# Patient Record
Sex: FEMALE | Race: WHITE | Employment: FULL TIME | URBAN - METROPOLITAN AREA
[De-identification: names, ages, dates, MRNs, and addresses within clinical notes are randomized per-mention and may not be internally consistent; named-entity substitution may affect disease eponyms.]

---

## 2018-01-15 ENCOUNTER — HOSPITAL ENCOUNTER (OUTPATIENT)
Dept: MAMMOGRAPHY | Age: 58
Discharge: HOME | End: 2018-01-15
Attending: INTERNAL MEDICINE | Admitting: INTERNAL MEDICINE

## 2018-01-15 DIAGNOSIS — Z12.31 VISIT FOR SCREENING MAMMOGRAM: ICD-10-CM

## 2019-04-06 ENCOUNTER — HOSPITAL ENCOUNTER (OUTPATIENT)
Dept: WOMENS IMAGING | Age: 59
Discharge: HOME | End: 2019-04-06
Payer: COMMERCIAL

## 2019-04-06 DIAGNOSIS — Z12.39 BREAST CANCER SCREENING: ICD-10-CM

## 2019-04-06 PROCEDURE — 77067 SCR MAMMO BI INCL CAD: CPT

## 2019-08-19 ENCOUNTER — OFFICE VISIT (OUTPATIENT)
Dept: ORTHOPEDIC SURGERY | Age: 59
End: 2019-08-19
Payer: COMMERCIAL

## 2019-08-19 VITALS — HEIGHT: 63 IN | WEIGHT: 138 LBS | BODY MASS INDEX: 24.45 KG/M2

## 2019-08-19 DIAGNOSIS — R52 PAIN: Primary | ICD-10-CM

## 2019-08-19 DIAGNOSIS — M25.511 RIGHT SHOULDER PAIN, UNSPECIFIED CHRONICITY: ICD-10-CM

## 2019-08-19 PROCEDURE — 99203 OFFICE O/P NEW LOW 30 MIN: CPT | Performed by: ORTHOPAEDIC SURGERY

## 2019-08-19 RX ORDER — LEVOTHYROXINE SODIUM 0.07 MG/1
75 TABLET ORAL
COMMUNITY
Start: 2018-10-16

## 2019-08-19 RX ORDER — ATORVASTATIN CALCIUM 20 MG/1
TABLET, FILM COATED ORAL
COMMUNITY
Start: 2019-07-23

## 2019-08-19 RX ORDER — ASPIRIN 81 MG/1
81 TABLET ORAL
COMMUNITY
Start: 2016-06-21

## 2019-08-19 NOTE — PROGRESS NOTES
file    Sexual activity: Not on file   Lifestyle    Physical activity:     Days per week: Not on file     Minutes per session: Not on file    Stress: Not on file   Relationships    Social connections:     Talks on phone: Not on file     Gets together: Not on file     Attends [employer] service: Not on file     Active member of club or organization: Not on file     Attends meetings of clubs or organizations: Not on file     Relationship status: Not on file    Intimate partner violence:     Fear of current or ex partner: Not on file     Emotionally abused: Not on file     Physically abused: Not on file     Forced sexual activity: Not on file   Other Topics Concern    Not on file   Social History Narrative    Not on file       No family history on file. Review of Systems  Pertinent items are noted in HPI  Review of systems reviewed from Patient History Form dated on 8/19/2019 and available in the patient's chart under the Media tab. Vital Signs  There were no vitals filed for this visit. General Exam:   Constitutional: Patient is adequately groomed with no evidence of malnutrition  Mental Status: The patient is oriented to time, place and person. The patient's mood and affect are appropriate. Lymphatic: The lymphatic examination bilaterally reveals all areas to be without enlargement or induration. Neurological: The patient has good coordination. There is no weakness or sensory deficit. Gait: normal    Right shoulder Examination    Inspection: No bruising or atrophy    Palpation: Tenderness over biceps tendon and anterolateral aspect of shoulder    Cervical Exam reproduces shoulder symptoms: Negative    Range of Motion:     Forward elevation: 170  External rotation at 0 degrees abduction: 45  Internal rotation to: L3 versus L1  External rotation at 90 degrees abduction: 90  Internal rotation at 90 degrees abduction: 70  Symmetric motion unless otherwise stated    Sensation: In tact to light

## 2019-08-21 ENCOUNTER — HOSPITAL ENCOUNTER (OUTPATIENT)
Dept: PHYSICAL THERAPY | Age: 59
Setting detail: THERAPIES SERIES
Discharge: HOME | End: 2019-08-21
Payer: COMMERCIAL

## 2019-08-21 PROCEDURE — 97140 MANUAL THERAPY 1/> REGIONS: CPT

## 2019-08-21 PROCEDURE — G0283 ELEC STIM OTHER THAN WOUND: HCPCS

## 2019-08-21 PROCEDURE — 97161 PT EVAL LOW COMPLEX 20 MIN: CPT

## 2019-08-21 PROCEDURE — 97110 THERAPEUTIC EXERCISES: CPT

## 2019-08-21 NOTE — PLAN OF CARE
[facility], [address], [address] [place] [address]  Phone: [phone]  Fax [phone]     Physical Therapy Certification    Dear Referring Practitioner: Dr. Malou Mills,    We had the pleasure of evaluating the following patient for physical therapy services at [address] [place] [address]. A summary of our findings can be found in the initial assessment below. This includes our plan of care. If you have any questions or concerns regarding these findings, please do not hesitate to contact me at the office phone number checked above. Thank you for the referral.       Physician Signature:_______________________________Date:__________________  By signing above (or electronic signature), therapists plan is approved by physician    Patient: Diamante Walden   : 1960   MRN: [id number]  Referring Physician: Referring Practitioner: Dr. Malou Mills      Evaluation Date: 2019      Medical Diagnosis Information:  Diagnosis: R shoulder pain (M25.511) RTC tendonitis, biceps tendonitis   Treatment Diagnosis: R shoulder pain (M25.511)                                         Insurance information: PT Insurance Information: UMR    Precautions/ Contra-indications: None  Latex Allergy:  [x]NO      []YES  Preferred Language for Healthcare:   [x]English       []other:    SUBJECTIVE: Patient stated complaint: Patient fell while rollerblading on May 31st, states has been hurting for past few months. Past few weeks have been increasingly worse, especially painful at nighttime. Pt unable to take NSAIDs d/t kidney issues. States she props it up and ices at night to get comfortable. States she is right hand dominant. Patient reports she runs and bikes a few times a week and used to participate in yoga 2-3 times a week. Belongs to [employer]. Reports pain is present along lateral arm, occasional through bicep.       Relevant Medical History: weeks  1. Disability index score of 22% or less for the [facility] to assist with reaching prior level of function. 2. Patient will demonstrate increased AROM to 160 deg flexion, 160 deg abduction, T2 ER, T7 IR to allow for proper joint functioning as indicated by patients Functional Deficits. 3. Patient will demonstrate an increase in Strength to 4+/5 pain free all directions at R shoulder to allow for proper functional mobility as indicated by patients Functional Deficits. 4. Patient will return to dressing and reaching overhead without increased symptoms or restriction.    5. Patient will return to pain/restriction free running and yoga work or transition to [place] for yoga (patient specific functional goal)         Electronically signed by:  Enoch Cruz, PT,DPT [id number]

## 2019-08-26 ENCOUNTER — HOSPITAL ENCOUNTER (OUTPATIENT)
Dept: PHYSICAL THERAPY | Age: 59
Setting detail: THERAPIES SERIES
Discharge: HOME | End: 2019-08-26
Payer: COMMERCIAL

## 2019-08-26 PROCEDURE — 97110 THERAPEUTIC EXERCISES: CPT | Performed by: PHYSICAL THERAPIST

## 2019-08-26 PROCEDURE — 97140 MANUAL THERAPY 1/> REGIONS: CPT | Performed by: PHYSICAL THERAPIST

## 2019-08-26 PROCEDURE — G0283 ELEC STIM OTHER THAN WOUND: HCPCS | Performed by: PHYSICAL THERAPIST

## 2019-08-26 NOTE — FLOWSHEET NOTE
[facility],  [address] [place]  Phone: [phone]  Fax [phone]      Physical Therapy Daily Treatment Note  Date:  2019    Patient Name:  Kristen Angela  \"Jodie\"  :  1960  MRN: [id number]  Restrictions/Precautions:    Medical/Treatment Diagnosis Information:  · Diagnosis: R shoulder pain (M25.511) RTC tendonitis, biceps tendonitis  · Treatment Diagnosis: R shoulder pain (G80.720)  Insurance/Certification information:  PT Insurance Information: R  Physician Information:  Referring Practitioner: Dr. Ovidio Perdomo of care signed (Y/N):     Date of Patient follow up with Physician:     G-Code (if applicable):      Date G-Code Applied:         Progress Note: [x]  Yes  []  No  Next due by: Visit #10      Latex Allergy:  [x]NO      []YES  Preferred Language for Healthcare:   [x]English       [x]other:    Visit # Insurance Allowable Requires auth   2 60    []no        []yes:     Pain level:  0-8/10     SUBJECTIVE: Patient reports the exercises seem to be helping her shoulder. Still having trouble sleeping because she can't get comfortable.      OBJECTIVE:    Observation: tightness posterior cuff, upper traps, pec   Test measurements:  NT this date    RESTRICTIONS/PRECAUTIONS:none    Exercises/Interventions:   Therapeutic Ex      Sets/rep comments   HEP   HEP   Sleeper stretch 20\" x 5    SL ER 2 x 10 x 3\"    SL abd 2 x 10 x 3\"              Doorway pec stretch 20\" x 5    True stretch flexion 20\" x 5    UT stretch B 30\" x 3 ea HEP   HEP   No money standing 5\" x 15 HEP             Finisher corner reach, ladders 4# 15x ea         RTB rows 15 x 3\"    YTB ext 15 x 3\"                                             Manual Intervention          STM/TPR upper trap, pec, posterior cuff  GIII post and inf mobs, IR stretch   14'                                  NMR re-education

## 2019-08-29 ENCOUNTER — HOSPITAL ENCOUNTER (OUTPATIENT)
Dept: PHYSICAL THERAPY | Age: 59
Setting detail: THERAPIES SERIES
Discharge: HOME | End: 2019-08-29
Payer: COMMERCIAL

## 2019-08-29 PROCEDURE — G0283 ELEC STIM OTHER THAN WOUND: HCPCS

## 2019-08-29 PROCEDURE — 97110 THERAPEUTIC EXERCISES: CPT

## 2019-08-29 PROCEDURE — 97140 MANUAL THERAPY 1/> REGIONS: CPT

## 2019-08-29 NOTE — FLOWSHEET NOTE
[facility],  [address]  Phone: [phone]  Fax [phone]      Physical Therapy Daily Treatment Note  Date:  2019    Patient Name:  Piedad Church  \"Jodie\"  :  1960  MRN: [id number]  Restrictions/Precautions:    Medical/Treatment Diagnosis Information:  · Diagnosis: R shoulder pain (M25.511) RTC tendonitis, biceps tendonitis  · Treatment Diagnosis: R shoulder pain (Q94.290)  Insurance/Certification information:  PT Insurance Information: UMR  Physician Information:  Referring Practitioner: Dr. Cholo Damico of care signed (Y/N):     Date of Patient follow up with Physician:     G-Code (if applicable):      Date G-Code Applied:         Progress Note: [x]  Yes  []  No  Next due by: Visit #10      Latex Allergy:  [x]NO      []YES  Preferred Language for Healthcare:   [x]English       [x]other:    Visit # Insurance Allowable Requires auth   3 60    []no        []yes:     Pain level: 4/10     SUBJECTIVE: Patient states she thinks the exercises are helping.     OBJECTIVE:    Observation: tightness posterior cuff, upper traps, pec   Test measurements:  NT this date    RESTRICTIONS/PRECAUTIONS:none    Exercises/Interventions:   Therapeutic Ex      Sets/rep comments   HEP   Cane press and flex 10\" x 10 HEP   Sleeper stretch 20\" x 5    SL ER 3 x 10 x 3\" Add wt nv   SL abd 2 x 10 x 3\"              Doorway pec stretch 30\" x 3    True stretch flexion 20\" x 5    UT stretch B 30\" x 3 ea HEP   HEP   HEP        Wall slides flex 10x 5\"              RTB rows 15 x 3\"                                               Manual Intervention          STM/TPR upper trap, pec, posterior cuff  GIII post and inf mobs, IR stretch   14' Tight UT                                 NMR re-education                                                     Therapeutic Exercise and NMR EXR  [x] (98895) Provided verbal/tactile cueing for activities related to strengthening, flexibility, endurance, ROM  for improvements in scapular, scapulothoracic and UE control with self care, reaching, carrying, lifting, house/yardwork, driving/computer work.    [] (36636) Provided verbal/tactile cueing for activities related to improving balance, coordination, kinesthetic sense, posture, motor skill, proprioception  to assist with  scapular, scapulothoracic and UE control with self care, reaching, carrying, lifting, house/yardwork, driving/computer work. Therapeutic Activities:    [] (05807 or 83059) Provided verbal/tactile cueing for activities related to improving balance, coordination, kinesthetic sense, posture, motor skill, proprioception and motor activation to allow for proper function of scapular, scapulothoracic and UE control with self care, carrying, lifting, driving/computer work.      Home Exercise Program:    [x] (23188) Reviewed/Progressed HEP activities related to strengthening, flexibility, endurance, ROM of scapular, scapulothoracic and UE control with self care, reaching, carrying, lifting, house/yardwork, driving/computer work  [] (29697) Reviewed/Progressed HEP activities related to improving balance, coordination, kinesthetic sense, posture, motor skill, proprioception of scapular, scapulothoracic and UE control with self care, reaching, carrying, lifting, house/yardwork, driving/computer work      Manual Treatments:  PROM / STM / Oscillations-Mobs:  G-I, II, III, IV (PA's, Inf., Post.)  [x] (60063) Provided manual therapy to mobilize soft tissue/joints of cervical/CT, scapular GHJ and UE for the purpose of modulating pain, promoting relaxation,  increasing ROM, reducing/eliminating soft tissue swelling/inflammation/restriction, improving soft tissue extensibility and allowing for proper ROM for normal function with self care, reaching, carrying, lifting, house/yardwork, driving/computer work    Modalities:  PM/CP 15'     Charges:  Timed Code Treatment Minutes: 39'   Total Treatment Minutes: 61'        [] EVAL (LOW) 455 1011 (typically 20 minutes face-to-face)  [] EVAL (MOD) 71163 (typically 30 minutes face-to-face)  [] EVAL (HIGH) 46268 (typically 45 minutes face-to-face)  [] RE-EVAL     [x] UB(51572) x 2    [] IONTO  [] NMR (05456) x     [] VASO  [x] Manual (80753) x 1     [] Other:  [] TA x      [] Mech Traction (45288)  [] ES(attended) (47385)      [x] ES (un) (85494):     GOALS:  Patient stated goal: \"Return to normal activities, sleep through the night without pain\"    Therapist goals for Patient:   Short Term Goals: To be achieved in: 2 weeks  1. Independent in HEP and progression per patient tolerance, in order to prevent re-injury. 2. Patient will have a decrease in pain to facilitate improvement in movement, function, and ADLs as indicated by Functional Deficits. Long Term Goals: To be achieved in: 6 weeks  1. Disability index score of 22% or less for the [facility] to assist with reaching prior level of function. 2. Patient will demonstrate increased AROM to 160 deg flexion, 160 deg abduction, T2 ER, T7 IR to allow for proper joint functioning as indicated by patients Functional Deficits. 3. Patient will demonstrate an increase in Strength to 4+/5 pain free all directions at R shoulder to allow for proper functional mobility as indicated by patients Functional Deficits. 4. Patient will return to dressing and reaching overhead without increased symptoms or restriction. 5. Patient will return to pain/restriction free running and yoga work or transition to [place] for yoga (patient specific functional goal)      Progression Towards Functional goals:  [x] Patient is progressing as expected towards functional goals listed. [] Progression is slowed due to complexities listed. [] Progression has been slowed due to co-morbidities.   [] Plan just implemented, too soon to assess goals progression  [] Other:     ASSESSMENT:  Patient did well with TE today, added to HEP to include post cuff stretch and side lying work.       Treatment/Activity Tolerance:  [x] Patient tolerated treatment well [] Patient limited by fatique  [] Patient limited by pain  [] Patient limited by other medical complications  [] Other:     Prognosis: [] Good [x] Fair  [] Poor    Patient Requires Follow-up: [x] Yes  [] No    PLAN: Cont strengthening   [x] Continue per plan of care [] Alter current plan (see comments)  [] Plan of care initiated [] Hold pending MD visit [] Discharge    Electronically signed by: Barbara Norwood, PT, DPT [id number]

## 2019-09-05 ENCOUNTER — HOSPITAL ENCOUNTER (OUTPATIENT)
Dept: PHYSICAL THERAPY | Age: 59
Setting detail: THERAPIES SERIES
Discharge: HOME | End: 2019-09-05
Payer: COMMERCIAL

## 2019-09-05 PROCEDURE — 97140 MANUAL THERAPY 1/> REGIONS: CPT

## 2019-09-05 PROCEDURE — 97110 THERAPEUTIC EXERCISES: CPT

## 2019-09-05 PROCEDURE — G0283 ELEC STIM OTHER THAN WOUND: HCPCS

## 2019-09-05 NOTE — FLOWSHEET NOTE
[facility],  [address] [place]  Phone: [phone]  Fax [phone]      Physical Therapy Daily Treatment Note  Date:  2019    Patient Name:  Taras Fu  \"Jodie\"  :  1960  MRN: [id number]  Restrictions/Precautions:    Medical/Treatment Diagnosis Information:  · Diagnosis: R shoulder pain (M25.511) RTC tendonitis, biceps tendonitis  · Treatment Diagnosis: R shoulder pain (C50.491)  Insurance/Certification information:  PT Insurance Information: UMR  Physician Information:  Referring Practitioner: Dr. Lillie Nye of care signed (Y/N):     Date of Patient follow up with Physician:     G-Code (if applicable):      Date G-Code Applied:         Progress Note: [x]  Yes  []  No  Next due by: Visit #10      Latex Allergy:  [x]NO      []YES  Preferred Language for Healthcare:   [x]English       [x]other:    Visit # Insurance Allowable Requires auth   4 60    []no        []yes:     Pain level: 8/10     SUBJECTIVE: Patient states she is having a bad week, had a lot of pain last night.     OBJECTIVE:    Observation: tightness posterior cuff, upper traps, pec   Test measurements:  NT this date    RESTRICTIONS/PRECAUTIONS:none    Exercises/Interventions:   Therapeutic Ex      Sets/rep comments   HEP   Pulleys 4'    Cane press and flex 10\" x 10 HEP      SL ER 3 x 10 x 3\" Add wt nv   SL abd 2 x 10 x 3\"              Doorway pec stretch 30\" x 3       UT stretch B 30\" x 3 ea HEP   HEP   HEP        Held d/t pain             RTB rows 2 x 15 x 3\"    YTB ext 2 x 10  x 3\"                                             Manual Intervention          STM/TPR upper trap, pec, posterior cuff  GIII post and inf mobs, IR stretch   14' Tight UT                                 NMR re-education                                                     Therapeutic Exercise and NMR EXR  [x] (78023) Provided verbal/tactile cueing for activities related to strengthening, flexibility, endurance, ROM  for improvements in scapular, scapulothoracic and UE control with self care, reaching, carrying, lifting, house/yardwork, driving/computer work.    [] (52272) Provided verbal/tactile cueing for activities related to improving balance, coordination, kinesthetic sense, posture, motor skill, proprioception  to assist with  scapular, scapulothoracic and UE control with self care, reaching, carrying, lifting, house/yardwork, driving/computer work. Therapeutic Activities:    [] (51761 or 89089) Provided verbal/tactile cueing for activities related to improving balance, coordination, kinesthetic sense, posture, motor skill, proprioception and motor activation to allow for proper function of scapular, scapulothoracic and UE control with self care, carrying, lifting, driving/computer work.      Home Exercise Program:    [x] (91664) Reviewed/Progressed HEP activities related to strengthening, flexibility, endurance, ROM of scapular, scapulothoracic and UE control with self care, reaching, carrying, lifting, house/yardwork, driving/computer work  [] (35793) Reviewed/Progressed HEP activities related to improving balance, coordination, kinesthetic sense, posture, motor skill, proprioception of scapular, scapulothoracic and UE control with self care, reaching, carrying, lifting, house/yardwork, driving/computer work      Manual Treatments:  PROM / STM / Oscillations-Mobs:  G-I, II, III, IV (PA's, Inf., Post.)  [x] (32418) Provided manual therapy to mobilize soft tissue/joints of cervical/CT, scapular GHJ and UE for the purpose of modulating pain, promoting relaxation,  increasing ROM, reducing/eliminating soft tissue swelling/inflammation/restriction, improving soft tissue extensibility and allowing for proper ROM for normal function with self care, reaching, carrying, lifting, house/yardwork, driving/computer work    Modalities:  PM/CP 15'     Charges:  Timed Code Treatment Minutes: d/t high pain level upon arrival to therapy today. Stuck to basic exercises and pt's pain level was decreased to 5/10 by end of session.       Treatment/Activity Tolerance:  [x] Patient tolerated treatment well [] Patient limited by fatique  [] Patient limited by pain  [] Patient limited by other medical complications  [] Other:     Prognosis: [] Good [x] Fair  [] Poor    Patient Requires Follow-up: [x] Yes  [] No    PLAN: Resume TE as able  [x] Continue per plan of care [] Alter current plan (see comments)  [] Plan of care initiated [] Hold pending MD visit [] Discharge    Electronically signed by: Juliana Santo, PT, DPT [id number]

## 2019-09-12 ENCOUNTER — HOSPITAL ENCOUNTER (OUTPATIENT)
Dept: PHYSICAL THERAPY | Age: 59
Setting detail: THERAPIES SERIES
Discharge: HOME | End: 2019-09-12
Payer: COMMERCIAL

## 2019-09-12 PROCEDURE — G0283 ELEC STIM OTHER THAN WOUND: HCPCS

## 2019-09-12 PROCEDURE — 97110 THERAPEUTIC EXERCISES: CPT

## 2019-09-12 PROCEDURE — 97140 MANUAL THERAPY 1/> REGIONS: CPT

## 2019-09-12 NOTE — FLOWSHEET NOTE
soon to assess goals progression  [] Other:     ASSESSMENT:  Patient with decreased pain today and able to tolerate more TE without any issues. At this point she has most difficulty with sleeping, hooking bra and donning/doffing tops. Overall she feels that she is improving.       Treatment/Activity Tolerance:  [x] Patient tolerated treatment well [] Patient limited by fatique  [] Patient limited by pain  [] Patient limited by other medical complications  [] Other:     Prognosis: [] Good [x] Fair  [] Poor    Patient Requires Follow-up: [x] Yes  [] No    PLAN: Reassess, update HEP  [x] Continue per plan of care [] Alter current plan (see comments)  [] Plan of care initiated [] Hold pending MD visit [] Discharge    Electronically signed by: Loraine Davis PT, DPT [id number]

## 2019-09-19 ENCOUNTER — HOSPITAL ENCOUNTER (OUTPATIENT)
Dept: PHYSICAL THERAPY | Age: 59
Setting detail: THERAPIES SERIES
Discharge: HOME | End: 2019-09-19
Payer: COMMERCIAL

## 2019-09-19 PROCEDURE — 97110 THERAPEUTIC EXERCISES: CPT | Performed by: PHYSICAL THERAPIST

## 2019-09-19 PROCEDURE — G0283 ELEC STIM OTHER THAN WOUND: HCPCS | Performed by: PHYSICAL THERAPIST

## 2019-09-19 PROCEDURE — 97140 MANUAL THERAPY 1/> REGIONS: CPT | Performed by: PHYSICAL THERAPIST

## 2019-09-19 NOTE — FLOWSHEET NOTE
normal function with self care, reaching, carrying, lifting, house/yardwork, driving/computer work    Modalities:  PM/CP 15'     Charges:  Timed Code Treatment Minutes: 40'   Total Treatment Minutes: 55        [] EVAL (LOW) 92605 (typically 20 minutes face-to-face)  [] EVAL (MOD) 80743 (typically 30 minutes face-to-face)  [] EVAL (HIGH) 27237 (typically 45 minutes face-to-face)  [] RE-EVAL     [x] VS(88723) x 2    [] IONTO  [] NMR (49244) x     [] VASO  [x] Manual (82645) x 1     [] Other:  [] TA x      [] Mech Traction (29255)  [] ES(attended) (33261)      [x] ES (un) (50720):     GOALS:  Patient stated goal: \"Return to normal activities, sleep through the night without pain\"    Therapist goals for Patient:   Short Term Goals: To be achieved in: 2 weeks  1. Independent in HEP and progression per patient tolerance, in order to prevent re-injury. 2. Patient will have a decrease in pain to facilitate improvement in movement, function, and ADLs as indicated by Functional Deficits. Long Term Goals: To be achieved in: 6 weeks  1. Disability index score of 22% or less for the [facility] to assist with reaching prior level of function. 2. Patient will demonstrate increased AROM to 160 deg flexion, 160 deg abduction, T2 ER, T7 IR to allow for proper joint functioning as indicated by patients Functional Deficits. 3. Patient will demonstrate an increase in Strength to 4+/5 pain free all directions at R shoulder to allow for proper functional mobility as indicated by patients Functional Deficits. 4. Patient will return to dressing and reaching overhead without increased symptoms or restriction. 5. Patient will return to pain/restriction free running and yoga work or transition to [email or web address] for yoga (patient specific functional goal)      Progression Towards Functional goals:  [x] Patient is progressing as expected towards functional goals listed. [] Progression is slowed due to complexities listed.   []

## 2019-09-26 ENCOUNTER — HOSPITAL ENCOUNTER (OUTPATIENT)
Dept: PHYSICAL THERAPY | Age: 59
Setting detail: THERAPIES SERIES
Discharge: HOME | End: 2019-09-26
Payer: COMMERCIAL

## 2019-09-26 PROCEDURE — 97110 THERAPEUTIC EXERCISES: CPT | Performed by: PHYSICAL THERAPIST

## 2019-09-26 PROCEDURE — 97140 MANUAL THERAPY 1/> REGIONS: CPT | Performed by: PHYSICAL THERAPIST

## 2019-09-26 PROCEDURE — G0283 ELEC STIM OTHER THAN WOUND: HCPCS | Performed by: PHYSICAL THERAPIST

## 2019-09-26 NOTE — FLOWSHEET NOTE
NMR re-education                                                     Therapeutic Exercise and NMR EXR  [x] (45200) Provided verbal/tactile cueing for activities related to strengthening, flexibility, endurance, ROM  for improvements in scapular, scapulothoracic and UE control with self care, reaching, carrying, lifting, house/yardwork, driving/computer work.    [] (03691) Provided verbal/tactile cueing for activities related to improving balance, coordination, kinesthetic sense, posture, motor skill, proprioception  to assist with  scapular, scapulothoracic and UE control with self care, reaching, carrying, lifting, house/yardwork, driving/computer work. Therapeutic Activities:    [] (54100 or 70129) Provided verbal/tactile cueing for activities related to improving balance, coordination, kinesthetic sense, posture, motor skill, proprioception and motor activation to allow for proper function of scapular, scapulothoracic and UE control with self care, carrying, lifting, driving/computer work.      Home Exercise Program:    [x] (11218) Reviewed/Progressed HEP activities related to strengthening, flexibility, endurance, ROM of scapular, scapulothoracic and UE control with self care, reaching, carrying, lifting, house/yardwork, driving/computer work  [] (20045) Reviewed/Progressed HEP activities related to improving balance, coordination, kinesthetic sense, posture, motor skill, proprioception of scapular, scapulothoracic and UE control with self care, reaching, carrying, lifting, house/yardwork, driving/computer work      Manual Treatments:  PROM / STM / Oscillations-Mobs:  G-I, II, III, IV (PA's, Inf., Post.)  [x] (18850) Provided manual therapy to mobilize soft tissue/joints of cervical/CT, scapular GHJ and UE for the purpose of modulating pain, promoting relaxation,  increasing ROM, reducing/eliminating soft tissue swelling/inflammation/restriction, improving soft tissue extensibility and allowing for listed. [] Progression has been slowed due to co-morbidities. [] Plan just implemented, too soon to assess goals progression  [] Other:     ASSESSMENT:  Patient with less pain than previous visit. Still demonstrates tightness in posterior cuff. Scap control is improving.      Treatment/Activity Tolerance:  [x] Patient tolerated treatment well [] Patient limited by fatique  [] Patient limited by pain  [] Patient limited by other medical complications  [] Other:     Prognosis: [] Good [x] Fair  [] Poor    Patient Requires Follow-up: [x] Yes  [] No    PLAN: Reassess, update HEP  [x] Continue per plan of care [] Alter current plan (see comments)  [] Plan of care initiated [] Hold pending MD visit [] Discharge    Electronically signed by: Ellyn Acevedo, [address] [facility], DPT [id number]

## 2019-11-18 ENCOUNTER — OFFICE VISIT (OUTPATIENT)
Dept: ORTHOPEDIC SURGERY | Age: 59
End: 2019-11-18
Payer: COMMERCIAL

## 2019-11-18 VITALS
SYSTOLIC BLOOD PRESSURE: 122 MMHG | BODY MASS INDEX: 24.45 KG/M2 | HEART RATE: 70 BPM | HEIGHT: 63 IN | DIASTOLIC BLOOD PRESSURE: 80 MMHG | WEIGHT: 138.01 LBS

## 2019-11-18 DIAGNOSIS — M75.81 TENDINITIS OF RIGHT ROTATOR CUFF: Primary | ICD-10-CM

## 2019-11-18 PROCEDURE — 99213 OFFICE O/P EST LOW 20 MIN: CPT | Performed by: ORTHOPAEDIC SURGERY

## 2019-11-18 PROCEDURE — 20610 DRAIN/INJ JOINT/BURSA W/O US: CPT | Performed by: ORTHOPAEDIC SURGERY

## 2019-11-18 RX ORDER — TRIAMCINOLONE ACETONIDE 40 MG/ML
40 INJECTION, SUSPENSION INTRA-ARTICULAR; INTRAMUSCULAR ONCE
Status: COMPLETED | OUTPATIENT
Start: 2019-11-18 | End: 2019-11-18

## 2019-11-18 RX ADMIN — TRIAMCINOLONE ACETONIDE 40 MG: 40 INJECTION, SUSPENSION INTRA-ARTICULAR; INTRAMUSCULAR at 08:54

## 2021-03-16 ENCOUNTER — HOSPITAL ENCOUNTER (OUTPATIENT)
Dept: WOMENS IMAGING | Age: 61
Discharge: HOME | End: 2021-03-16
Payer: COMMERCIAL

## 2021-03-16 DIAGNOSIS — Z12.31 BREAST CANCER SCREENING BY MAMMOGRAM: ICD-10-CM

## 2021-03-16 DIAGNOSIS — Z78.0 ASYMPTOMATIC AGE-RELATED POSTMENOPAUSAL STATE: ICD-10-CM

## 2021-03-16 DIAGNOSIS — Z13.820 ENCOUNTER FOR OSTEOPOROSIS SCREENING IN ASYMPTOMATIC POSTMENOPAUSAL PATIENT: ICD-10-CM

## 2021-03-16 DIAGNOSIS — Z78.0 ENCOUNTER FOR OSTEOPOROSIS SCREENING IN ASYMPTOMATIC POSTMENOPAUSAL PATIENT: ICD-10-CM

## 2021-03-16 PROCEDURE — 77080 DXA BONE DENSITY AXIAL: CPT

## 2021-03-16 PROCEDURE — 77067 SCR MAMMO BI INCL CAD: CPT

## 2021-06-02 ENCOUNTER — CLINICAL DOCUMENTATION (OUTPATIENT)
Dept: OTHER | Age: 61
End: 2021-06-02

## 2022-01-15 ENCOUNTER — CLINICAL DOCUMENTATION (OUTPATIENT)
Dept: OTHER | Age: 62
End: 2022-01-15

## 2022-05-18 ENCOUNTER — HOSPITAL ENCOUNTER (OUTPATIENT)
Dept: WOMENS IMAGING | Age: 62
Discharge: HOME | End: 2022-05-18
Payer: COMMERCIAL

## 2022-05-18 VITALS — HEIGHT: 62 IN | WEIGHT: 138 LBS | BODY MASS INDEX: 25.4 KG/M2

## 2022-05-18 DIAGNOSIS — Z12.31 ENCOUNTER FOR SCREENING MAMMOGRAM FOR BREAST CANCER: ICD-10-CM

## 2022-05-18 PROCEDURE — 77067 SCR MAMMO BI INCL CAD: CPT

## 2023-06-22 ENCOUNTER — HOSPITAL ENCOUNTER (OUTPATIENT)
Dept: WOMENS IMAGING | Age: 63
Discharge: HOME | End: 2023-06-22
Payer: COMMERCIAL

## 2023-06-22 DIAGNOSIS — Z12.31 BREAST CANCER SCREENING BY MAMMOGRAM: ICD-10-CM

## 2023-06-22 PROCEDURE — 77063 BREAST TOMOSYNTHESIS BI: CPT

## 2024-11-19 ENCOUNTER — HOSPITAL ENCOUNTER (OUTPATIENT)
Dept: WOMENS IMAGING | Age: 64
Discharge: HOME | End: 2024-11-19
Payer: COMMERCIAL

## 2024-11-19 VITALS — HEIGHT: 63 IN | WEIGHT: 133 LBS | BODY MASS INDEX: 23.57 KG/M2

## 2024-11-19 DIAGNOSIS — Z12.31 SCREENING MAMMOGRAM FOR BREAST CANCER: ICD-10-CM

## 2024-11-19 PROCEDURE — 77063 BREAST TOMOSYNTHESIS BI: CPT
